# Patient Record
Sex: FEMALE | Race: WHITE | URBAN - METROPOLITAN AREA
[De-identification: names, ages, dates, MRNs, and addresses within clinical notes are randomized per-mention and may not be internally consistent; named-entity substitution may affect disease eponyms.]

---

## 2017-04-13 ENCOUNTER — OFFICE VISIT (OUTPATIENT)
Dept: ORTHOPEDICS | Facility: CLINIC | Age: 42
End: 2017-04-13

## 2017-04-13 DIAGNOSIS — M24.529: Primary | ICD-10-CM

## 2017-04-13 NOTE — PROGRESS NOTES
HISTORY OF PRESENT ILLNESS:  A pleasant 41-year-old right-hand dominant female with a complicated past medical history significant for bilateral brachial plexopathy and cerebral palsy of the lower extremities.  She does bring records from her birth today.  When reviewed, reveal that she was breech presentation.  She was initially felt to have a bilateral brachial plexopathy with complete innervation of C5 through C7.  She had recovery in her biceps and triceps at around 3 months.  By 9 months, her wrist had fully recovered but she continued to have some weakness in her shoulder and elbow.  She was treated by Dr. Mobley at [place] for lower extremity contractures.  She continues to endorse numbness and tingling in her right upper extremity, particularly at night.  She often awakes and has to shake out her arm.  It is circumferential from her elbow down.  She also notes some tingling in her shoulder.  She underwent an EMG in December which was normal with normal motor and sensory findings in the right upper extremity.      PHYSICAL EXAMINATION:     GENERAL:  Pleasant, age-appropriate female in no acute distress.   RESPIRATORY:  Nonlabored.   RIGHT UPPER EXTREMITY:  Fires EPL, FPL and interossei.  Biceps and triceps with 5/5 strength.  She does have diminution of the radial pulse with her hand at her side taking a deep inspiration and hunching her shoulders forward.  She has reproduction of her numbness and tingling when she elevates her right upper extremity at the shoulder and does repetitive grasp and release.  She does have restriction of her shoulder motion and elbow motion in all planes.      ASSESSMENT & PLAN:  A 41-year-old female with a history of bilateral upper extremity brachial plexopathy.  On EMG, it seemed like she has had full recovery of her nerves, though she continues to have sequelae in the form of contractures.  It is likely that she has transient nerve compression, which seems to be coming from the  thoracic outlet and seems to be postural.  We did discuss that she should be on a regular stretching program for her chronic contractures and work on periscapular strengthening and stretching for her transient nerve compression in the thoracic outlet.  She has an appointment with a physiatrist in May.  She was encouraged to keep this appointment and continue lifelong stretching exercises.  She was amenable to the plan.  She will follow up on an as-needed basis.      The patient was seen and examined with Dr. Sanchez who agrees with the assessment and plan.      Dictated by Broderick Eugene MD    I have personally examined this patient and have reviewed the clinical presentation and progress note with the resident. I agree with the treatment plan as outlined. The plan was formulated with the resident on the day of the resident's dictation.        cc:  Sandy Cruz

## 2017-04-13 NOTE — MR AVS SNAPSHOT
After Visit Summary   4/13/2017    Sandy Cruz    MRN: [id number]           Patient Information     Date Of Birth          1975        Visit Information        Provider Department      4/13/2017 8:40 AM Nubia Sanchez MD [facility]        Today's Diagnoses     Joint contracture of upper arm    -  1       Follow-ups after your visit        Follow-up notes from your care team     Return if symptoms worsen or fail to improve.      Who to contact     Please call your clinic at [phone] to:    Ask questions about your health    Make or cancel appointments    Discuss your medicines    Learn about your test results    Speak to your doctor   If you have compliments or concerns about an experience at your clinic, or if you wish to file a complaint, please contact [facility] Patient Relations at [phone] or email us at [email or web address]         Additional Information About Your Visit        MyChart Information     Heidi Shaulist gives you secure access to your electronic health record. If you see a primary care provider, you can also send messages to your care team and make appointments. If you have questions, please call your primary care clinic.  If you do not have a primary care provider, please call [phone] and they will assist you.      [employer] is an electronic gateway that provides easy, online access to your medical records. With [employer], you can request a clinic appointment, read your test results, renew a prescription or communicate with your care team.     To access your existing account, please contact your [facility] or call [phone] for assistance.        Care EveryWhere ID     This is your Care EveryWhere ID. This could be used by other organizations to access your [place] medical records  [id number]         Blood Pressure from Last 3 Encounters:   No data found for BP    Weight  from Last 3 Encounters:   12/22/16 57.6 kg (127 lb)              Today, you had the following     No orders found for display       Primary Care Provider    None Doctor, MD       No address on file        Thank you!     Thank you for choosing [facility]  for your care. Our goal is always to provide you with excellent care. Hearing back from our patients is one way we can continue to improve our services. Please take a few minutes to complete the written survey that you may receive in the mail after your visit with us. Thank you!             Your Updated Medication List - Protect others around you: Learn how to safely use, store and throw away your medicines at www.disposemymeds.org.          This list is accurate as of: 4/13/17 11:59 PM.  Always use your most recent med list.                   Brand Name Dispense Instructions for use    atorvastatin 40 MG tablet    LIPITOR     Take 40 mg by mouth       sertraline 100 MG tablet    ZOLOFT     Take 100 mg by mouth

## 2017-04-13 NOTE — NURSING NOTE
Reason For Visit:   Chief Complaint   Patient presents with     RECHECK     Follow up EMG results, patient brought records from birth      Age: 41 year old    Currently working? Yes.    Date of surgery: Brought records from birth    Pain Assessment  Patient Currently in Pain: Yes  Aggravating Factors:  (Pain while sleeping)    Hand Dominance Evaluation  Hand Dominance: Right    MRI recently done through [facility] - right shoulder and cervical

## 2017-04-13 NOTE — LETTER
4/13/2017       RE: Sandy Cruz  [id number] [address] [facility] [address] [facility] [address]     Dear Colleague,    Thank you for referring your patient, Sandy Cruz, to the [facility] at [facility]. Please see a copy of my visit note below.    HISTORY OF PRESENT ILLNESS:  A pleasant 41-year-old right-hand dominant female with a complicated past medical history significant for bilateral brachial plexopathy and cerebral palsy of the lower extremities.  She does bring records from her birth today.  When reviewed, reveal that she was breech presentation.  She was initially felt to have a bilateral brachial plexopathy with complete innervation of C5 through C7.  She had recovery in her biceps and triceps at around 3 months.  By 9 months, her wrist had fully recovered but she continued to have some weakness in her shoulder and elbow.  She was treated by Dr. Mobley at [place] for lower extremity contractures.  She continues to endorse numbness and tingling in her right upper extremity, particularly at night.  She often awakes and has to shake out her arm.  It is circumferential from her elbow down.  She also notes some tingling in her shoulder.  She underwent an EMG in December which was normal with normal motor and sensory findings in the right upper extremity.      PHYSICAL EXAMINATION:     GENERAL:  Pleasant, age-appropriate female in no acute distress.   RESPIRATORY:  Nonlabored.   RIGHT UPPER EXTREMITY:  Fires EPL, FPL and interossei.  Biceps and triceps with 5/5 strength.  She does have diminution of the radial pulse with her hand at her side taking a deep inspiration and hunching her shoulders forward.  She has reproduction of her numbness and tingling when she elevates her right upper extremity at the shoulder and does repetitive grasp and release.  She does have restriction of her shoulder motion and elbow motion in all planes.      ASSESSMENT & PLAN:  A 41-year-old female  with a history of bilateral upper extremity brachial plexopathy.  On EMG, it seemed like she has had full recovery of her nerves, though she continues to have sequelae in the form of contractures.  It is likely that she has transient nerve compression, which seems to be coming from the thoracic outlet and seems to be postural.  We did discuss that she should be on a regular stretching program for her chronic contractures and work on periscapular strengthening and stretching for her transient nerve compression in the thoracic outlet.  She has an appointment with a physiatrist in May.  She was encouraged to keep this appointment and continue lifelong stretching exercises.  She was amenable to the plan.  She will follow up on an as-needed basis.      The patient was seen and examined with Dr. Sanchez who agrees with the assessment and plan.      Dictated by Broderick Eugene MD    I have personally examined this patient and have reviewed the clinical presentation and progress note with the resident. I agree with the treatment plan as outlined. The plan was formulated with the resident on the day of the resident's dictation.     Again, thank you for allowing me to participate in the care of your patient.      Sincerely,    Nubia Sanchez MD         cc:    Sandy Cruz  [address] [facility] [address]

## 2017-08-05 ENCOUNTER — HEALTH MAINTENANCE LETTER (OUTPATIENT)
Age: 42
End: 2017-08-05

## 2019-11-05 ENCOUNTER — HEALTH MAINTENANCE LETTER (OUTPATIENT)
Age: 44
End: 2019-11-05

## 2020-11-22 ENCOUNTER — HEALTH MAINTENANCE LETTER (OUTPATIENT)
Age: 45
End: 2020-11-22

## 2021-09-19 ENCOUNTER — HEALTH MAINTENANCE LETTER (OUTPATIENT)
Age: 46
End: 2021-09-19

## 2022-01-09 ENCOUNTER — HEALTH MAINTENANCE LETTER (OUTPATIENT)
Age: 47
End: 2022-01-09

## 2022-11-20 ENCOUNTER — HEALTH MAINTENANCE LETTER (OUTPATIENT)
Age: 47
End: 2022-11-20

## 2023-04-16 ENCOUNTER — HEALTH MAINTENANCE LETTER (OUTPATIENT)
Age: 48
End: 2023-04-16

## 2023-11-25 ENCOUNTER — HEALTH MAINTENANCE LETTER (OUTPATIENT)
Age: 48
End: 2023-11-25